# Patient Record
Sex: MALE | Race: WHITE
[De-identification: names, ages, dates, MRNs, and addresses within clinical notes are randomized per-mention and may not be internally consistent; named-entity substitution may affect disease eponyms.]

---

## 2018-09-20 ENCOUNTER — HOSPITAL ENCOUNTER (OUTPATIENT)
Dept: HOSPITAL 61 - PCVCIMAG | Age: 53
Discharge: HOME | End: 2018-09-20
Attending: INTERNAL MEDICINE
Payer: COMMERCIAL

## 2018-09-20 DIAGNOSIS — R07.9: Primary | ICD-10-CM

## 2018-09-20 PROCEDURE — 93351 STRESS TTE COMPLETE: CPT

## 2018-09-20 PROCEDURE — 93325 DOPPLER ECHO COLOR FLOW MAPG: CPT

## 2018-09-21 NOTE — PCVCIMAG
--------------- APPROVED REPORT --------------





Study performed:  09/20/2018 14:53:11



Exam:  Stress Echocardiogram

Indication: Chest pain

Patient Location: Echo lab

Stress Nurse: Kylee Cardenas RN

Status: routine



Ht: 5 ft 11 in  

HR: 62 bpm      BP: 122/78 mmHg

Rhythm: NSR



Procedure

The patient underwent an Exercise Stress Test using the Anderson 

Protocol. Blood pressure, heart rate, and EKG were monitored.

An Echocardiogram was performed by technician in four stages in quad 

fashion.  At peak stress, four selected images were obtained and 

placed side by side with resting images for comparison.



Stress Test Details

Stress Test:  Exercise stress testing was performed using a Anderson 

protocol.

HR

Resting HR:            62 bpmMax Heart Rate (APMHR): 167 bpm 

Max HR Achieved:  187 bpmTarget HR (85% APMHR): 141 bpm

% of APMHR:         111

Recovery HR:            114 bpm

HR response to stress: Normal HR response to stress



BP

Resting BP:  122/78 mmHg

Max BP:       150/70 mmHg

Recovery BP:       124/80 mmHg



ECG

Resting ECG:  Sinus Rhythm

Stress ECG:     Sinus Rhythm

Recovery ECG: Sinus Rhythm



Clinical

Reason for Termination: Maximal effort

Exercise duration: 12 min 30 sec

Highest Stage Achieved: Stage 5: 5.0 mph at 18% grade. 

Exercise capacity: 15.30 METs

Overall Exercise Capacity for Age: Good



Stress ECG Conclusion

ECG: Non-ischemic

Clinical: Non-ischemic



Pre-Stress Echo

The resting Echocardiogram showed normal left ventricular 

contractility with an estimated Ejection Fraction of about &gt;55%. 

Normal wall motion in all segments on baseline images.



Post-Stress Echo

The stress Echocardiogram showed normal left ventricular 

contractility with an estimated Ejection Fraction of about 60-65%. 

Normal augmentation of wall motion in all segments on post stress 

images.



Clinical

No clinical or ECG evidence for ischemia.



Conclusion

Clinical Response:  Non-ischemic

Exercise Capacity:  Average

Stress ECG Response:  Non-ischemic

Stress Echo Images:  Non-ischemic

The left ventricle is normal in size and wall thickness in both the 

rest and stress images.



1. Low risk study



2. Excellent functional capacity



Other Information

Study Quality: Good



&lt;Conclusion&gt;

The left ventricle is normal in size and wall thickness in both the 

rest and stress images.



1. Low risk study



2. Excellent functional capacity